# Patient Record
Sex: FEMALE | URBAN - NONMETROPOLITAN AREA
[De-identification: names, ages, dates, MRNs, and addresses within clinical notes are randomized per-mention and may not be internally consistent; named-entity substitution may affect disease eponyms.]

---

## 2018-02-19 ENCOUNTER — TELEPHONE (OUTPATIENT)
Dept: FAMILY MEDICINE CLINIC | Age: 63
End: 2018-02-19

## 2018-02-19 DIAGNOSIS — I69.398 OTHER SEQUELAE OF CEREBRAL INFARCTION: ICD-10-CM

## 2018-02-19 DIAGNOSIS — I25.10 ATHEROSCLEROSIS OF NATIVE CORONARY ARTERY OF NATIVE HEART WITHOUT ANGINA PECTORIS: ICD-10-CM

## 2018-02-19 DIAGNOSIS — M62.81 MUSCLE WEAKNESS (GENERALIZED): ICD-10-CM

## 2018-02-19 DIAGNOSIS — I69.311 MEMORY DEFICIT FOLLOWING CEREBRAL INFARCTION: Primary | ICD-10-CM

## 2018-02-19 DIAGNOSIS — I10 ESSENTIAL (PRIMARY) HYPERTENSION: ICD-10-CM

## 2018-02-19 DIAGNOSIS — F01.50 VASCULAR DEMENTIA WITHOUT BEHAVIORAL DISTURBANCE (HCC): ICD-10-CM

## 2018-02-19 PROCEDURE — G0180 MD CERTIFICATION HHA PATIENT: HCPCS | Performed by: FAMILY MEDICINE

## 2019-04-03 LAB
AGE FOR GFR: 63
ANION GAP SERPL CALCULATED.3IONS-SCNC: 11 MMOL/L
BASOPHILS # BLD: 0.05 THOU/MM3 (ref 0–0.3)
BUN BLDV-MCNC: 45 MG/DL (ref 7–17)
CALCIUM SERPL-MCNC: 9.5 MG/DL (ref 8.4–10.2)
CHLORIDE BLD-SCNC: 110 MMOL/L (ref 98–120)
CO2: 26 MMOL/L (ref 22–31)
CREAT SERPL-MCNC: 1.8 MG/DL (ref 0.5–1)
DIFFERENTIAL: AUTOMATED DIFF
EGFR BF: 34 ML/MIN/1.73 M2
EGFR BM: 46 ML/MIN/1.73 M2
EGFR WF: 28 ML/MIN/1.73 M2
EGFR WM: 38 ML/MIN/1.73 M2
EOSINOPHIL # BLD: 0.25 THOU/MM3 (ref 0–1.1)
GLUCOSE: 96 MG/DL (ref 65–105)
HCT VFR BLD CALC: 35.9 % (ref 37–47)
HEMOGLOBIN: 10.9 G/DL (ref 12–16)
LYMPHOCYTES # BLD: 2.55 THOU/MM3 (ref 1–5.5)
MCH RBC QN AUTO: 27.3 PG (ref 28.5–32)
MCHC RBC AUTO-ENTMCNC: 30.4 G/DL (ref 32–37)
MCV RBC AUTO: 89.9 FL (ref 80–94)
MONOCYTES # BLD: 0.54 THOU/MM3 (ref 0.1–1)
NEUTROPHILS: 4.46 THOU/MM3 (ref 2–8.1)
PDW BLD-RTO: 14.7 % (ref 8.5–15.5)
PLATELET # BLD: 282 THOU/MM3 (ref 130–400)
PMV BLD AUTO: 5.8 FL (ref 7.4–11)
POTASSIUM SERPL-SCNC: 4.6 MMOL/L (ref 3.6–5)
RBC # BLD: 4 M/UL (ref 4.2–5.4)
SODIUM BLD-SCNC: 142 MMOL/L (ref 135–145)
T4 FREE: 0.99 NG/DL (ref 0.78–2.1)
TSH SERPL DL<=0.05 MIU/L-ACNC: 1.4 MIU/ML (ref 0.49–4.6)
WBC # BLD: 7.84 THOU/ML3 (ref 4.8–10)

## 2019-05-30 LAB
A/G RATIO: 1.4 RATIO
AGE FOR GFR: 63
ALBUMIN: 3.6 G/DL (ref 3.5–5)
ALK PHOSPHATASE: 79 UNITS/L (ref 38–126)
ALT SERPL-CCNC: 25 UNITS/L (ref 9–52)
ANION GAP SERPL CALCULATED.3IONS-SCNC: 15 MMOL/L
APPEARANCE: ABNORMAL
AST SERPL-CCNC: 15 UNITS/L (ref 14–36)
BACTERIA: ABNORMAL 1HPF
BASOPHILS # BLD: 0.06 THOU/MM3 (ref 0–0.3)
BILIRUB SERPL-MCNC: 0.4 MG/DL (ref 0.2–1.3)
BILIRUBIN: NEGATIVE
BLOOD: NEGATIVE
BUN BLDV-MCNC: 47 MG/DL (ref 7–17)
CALCIUM SERPL-MCNC: 9.8 MG/DL (ref 8.4–10.2)
CASTS: PRESENT /LPF
CHLORIDE BLD-SCNC: 106 MMOL/L (ref 98–120)
CO2: 25 MMOL/L (ref 22–31)
COLOR: YELLOW
CREAT SERPL-MCNC: 2.2 MG/DL (ref 0.5–1)
CRYSTALS: ABNORMAL /HPF
DIFFERENTIAL: AUTOMATED DIFF
EGFR BF: 27 ML/MIN/1.73 M2
EGFR BM: 37 ML/MIN/1.73 M2
EGFR WF: 23 ML/MIN/1.73 M2
EGFR WM: 30 ML/MIN/1.73 M2
EOSINOPHIL # BLD: 0.16 THOU/MM3 (ref 0–1.1)
EPITHELIAL CELLS, UA: ABNORMAL /HPF
GLOBULIN: 2.5 G/DL
GLUCOSE: 85 MG/DL (ref 65–105)
GLUCOSE: NEGATIVE MG/DL
HCT VFR BLD CALC: 32.1 % (ref 37–47)
HEMOGLOBIN: 9.7 G/DL (ref 12–16)
HYALINE CASTS: ABNORMAL
KETONES: NEGATIVE MG/DL
LEUKOCYTES, UA: NEGATIVE
LYMPHOCYTES # BLD: 2.55 THOU/MM3 (ref 1–5.5)
MCH RBC QN AUTO: 27.7 PG (ref 28.5–32)
MCHC RBC AUTO-ENTMCNC: 30.3 G/DL (ref 32–37)
MCV RBC AUTO: 91.4 FL (ref 80–94)
MICROSCOPIC URINE: ABNORMAL
MONOCYTES # BLD: 0.67 THOU/MM3 (ref 0.1–1)
MUCUS: ABNORMAL /HPF
NEUTROPHILS: 4.5 THOU/MM3 (ref 2–8.1)
NITRITE, URINE: NEGATIVE
PDW BLD-RTO: 15.2 % (ref 8.5–15.5)
PH: 5.5 PH (ref 5–8.5)
PLATELET # BLD: 401 THOU/MM3 (ref 130–400)
PMV BLD AUTO: 6.1 FL (ref 7.4–11)
POTASSIUM SERPL-SCNC: 4.7 MMOL/L (ref 3.6–5)
PROTEIN,SCREEN: NEGATIVE MG/DL
RBC # BLD: 3.51 M/UL (ref 4.2–5.4)
RBC: ABNORMAL /HPF (ref 0–3)
SODIUM BLD-SCNC: 141 MMOL/L (ref 135–145)
SPECIFIC GRAVITY, URINE: 1.02 MG/DL (ref 1–1)
TOTAL PROTEIN: 6.1 G/DL (ref 6.3–8.2)
URINE CULTURE, ROUTINE: NORMAL
UROBILINOGEN, URINE: 0.2 MG/DL (ref 0.2–1)
WBC # BLD: 7.93 THOU/ML3 (ref 4.8–10)
WBC URINE: ABNORMAL (ref 0–4)
YEAST: ABNORMAL /HPF

## 2019-06-03 LAB
AGE FOR GFR: 63
ANION GAP SERPL CALCULATED.3IONS-SCNC: 11 MMOL/L
BUN BLDV-MCNC: 48 MG/DL (ref 7–17)
CALCIUM SERPL-MCNC: 9.9 MG/DL (ref 8.4–10.2)
CHLORIDE BLD-SCNC: 107 MMOL/L (ref 98–120)
CO2: 26 MMOL/L (ref 22–31)
CREAT SERPL-MCNC: 2.1 MG/DL (ref 0.5–1)
EGFR BF: 29 ML/MIN/1.73 M2
EGFR BM: 39 ML/MIN/1.73 M2
EGFR WF: 24 ML/MIN/1.73 M2
EGFR WM: 32 ML/MIN/1.73 M2
FERRITIN: 90 NG/ML (ref 10–28)
GLUCOSE: 95 MG/DL (ref 65–105)
IRON: 54 MG/DL (ref 37–170)
POTASSIUM SERPL-SCNC: 4.4 MMOL/L (ref 3.6–5)
SODIUM BLD-SCNC: 140 MMOL/L (ref 135–145)
VITAMIN B-12: 711 PG/ML (ref 239–931)

## 2019-06-10 LAB
AGE FOR GFR: 63
ANION GAP SERPL CALCULATED.3IONS-SCNC: 11 MMOL/L
BASOPHILS # BLD: 0.09 THOU/MM3 (ref 0–0.3)
BUN BLDV-MCNC: 43 MG/DL (ref 7–17)
CALCIUM SERPL-MCNC: 9.8 MG/DL (ref 8.4–10.2)
CHLORIDE BLD-SCNC: 110 MMOL/L (ref 98–120)
CO2: 25 MMOL/L (ref 22–31)
CREAT SERPL-MCNC: 1.9 MG/DL (ref 0.5–1)
DIFFERENTIAL: AUTOMATED DIFF
EGFR BF: 32 ML/MIN/1.73 M2
EGFR BM: 44 ML/MIN/1.73 M2
EGFR WF: 27 ML/MIN/1.73 M2
EGFR WM: 36 ML/MIN/1.73 M2
EOSINOPHIL # BLD: 0.32 THOU/MM3 (ref 0–1.1)
GLUCOSE: 83 MG/DL (ref 65–105)
HCT VFR BLD CALC: 31.2 % (ref 37–47)
HEMOGLOBIN: 9.8 G/DL (ref 12–16)
LYMPHOCYTES # BLD: 2.72 THOU/MM3 (ref 1–5.5)
MCH RBC QN AUTO: 28.6 PG (ref 28.5–32)
MCHC RBC AUTO-ENTMCNC: 31.3 G/DL (ref 32–37)
MCV RBC AUTO: 91.5 FL (ref 80–94)
MONOCYTES # BLD: 0.64 THOU/MM3 (ref 0.1–1)
NEUTROPHILS: 4.81 THOU/MM3 (ref 2–8.1)
PDW BLD-RTO: 14.9 % (ref 8.5–15.5)
PLATELET # BLD: 373 THOU/MM3 (ref 130–400)
PMV BLD AUTO: 6.1 FL (ref 7.4–11)
POTASSIUM SERPL-SCNC: 4.8 MMOL/L (ref 3.6–5)
RBC # BLD: 3.41 M/UL (ref 4.2–5.4)
SODIUM BLD-SCNC: 141 MMOL/L (ref 135–145)
WBC # BLD: 8.59 THOU/ML3 (ref 4.8–10)

## 2019-08-22 LAB
AGE FOR GFR: 63
ANION GAP SERPL CALCULATED.3IONS-SCNC: 14 MMOL/L
BASOPHILS # BLD: 0.07 THOU/MM3 (ref 0–0.3)
BUN BLDV-MCNC: 51 MG/DL (ref 7–17)
CALCIUM SERPL-MCNC: 9.8 MG/DL (ref 8.4–10.2)
CHLORIDE BLD-SCNC: 110 MMOL/L (ref 98–120)
CO2: 24 MMOL/L (ref 22–31)
CREAT SERPL-MCNC: 2.1 MG/DL (ref 0.5–1)
DIFFERENTIAL: AUTOMATED DIFF
EGFR BF: 29 ML/MIN/1.73 M2
EGFR BM: 39 ML/MIN/1.73 M2
EGFR WF: 24 ML/MIN/1.73 M2
EGFR WM: 32 ML/MIN/1.73 M2
EOSINOPHIL # BLD: 0.3 THOU/MM3 (ref 0–1.1)
GLUCOSE: 94 MG/DL (ref 65–105)
HCT VFR BLD CALC: 35.1 % (ref 37–47)
HEMOGLOBIN: 10.5 G/DL (ref 12–16)
LYMPHOCYTES # BLD: 2.55 THOU/MM3 (ref 1–5.5)
MCH RBC QN AUTO: 27.8 PG (ref 28.5–32)
MCHC RBC AUTO-ENTMCNC: 29.8 G/DL (ref 32–37)
MCV RBC AUTO: 93.5 FL (ref 80–94)
MONOCYTES # BLD: 0.56 THOU/MM3 (ref 0.1–1)
NEUTROPHILS: 4.44 THOU/MM3 (ref 2–8.1)
PDW BLD-RTO: 14 % (ref 8.5–15.5)
PLATELET # BLD: 316 THOU/MM3 (ref 130–400)
PMV BLD AUTO: 6.3 FL (ref 7.4–11)
POTASSIUM SERPL-SCNC: 4.8 MMOL/L (ref 3.6–5)
RBC # BLD: 3.76 M/UL (ref 4.2–5.4)
SODIUM BLD-SCNC: 143 MMOL/L (ref 135–145)
WBC # BLD: 7.92 THOU/ML3 (ref 4.8–10)

## 2019-09-05 ENCOUNTER — OFFICE VISIT (OUTPATIENT)
Dept: SURGERY | Age: 64
End: 2019-09-05
Payer: MEDICAID

## 2019-09-05 VITALS
DIASTOLIC BLOOD PRESSURE: 59 MMHG | HEIGHT: 67 IN | BODY MASS INDEX: 36.88 KG/M2 | TEMPERATURE: 97.9 F | SYSTOLIC BLOOD PRESSURE: 106 MMHG | HEART RATE: 83 BPM | WEIGHT: 235 LBS

## 2019-09-05 DIAGNOSIS — D64.9 CHRONIC ANEMIA: Primary | ICD-10-CM

## 2019-09-05 DIAGNOSIS — R53.82 CHRONIC FATIGUE: ICD-10-CM

## 2019-09-05 DIAGNOSIS — I95.9 HYPOTENSION, UNSPECIFIED HYPOTENSION TYPE: ICD-10-CM

## 2019-09-05 PROCEDURE — 99203 OFFICE O/P NEW LOW 30 MIN: CPT | Performed by: SURGERY

## 2019-09-05 RX ORDER — HYDROCHLOROTHIAZIDE 25 MG/1
25 TABLET ORAL DAILY
COMMUNITY

## 2019-09-05 RX ORDER — HYDROCODONE BITARTRATE AND ACETAMINOPHEN 5; 325 MG/1; MG/1
1 TABLET ORAL EVERY 8 HOURS PRN
COMMUNITY
End: 2020-04-27 | Stop reason: SDUPTHER

## 2019-09-05 RX ORDER — ACETAMINOPHEN 500 MG
1000 TABLET ORAL 3 TIMES DAILY PRN
COMMUNITY

## 2019-09-05 RX ORDER — FUROSEMIDE 40 MG/1
40 TABLET ORAL DAILY
COMMUNITY

## 2019-09-05 RX ORDER — AMLODIPINE BESYLATE 2.5 MG/1
7.5 TABLET ORAL DAILY
COMMUNITY

## 2019-09-05 RX ORDER — METOPROLOL TARTRATE 50 MG/1
50 TABLET, FILM COATED ORAL 2 TIMES DAILY
COMMUNITY

## 2019-09-05 RX ORDER — ATORVASTATIN CALCIUM 20 MG/1
20 TABLET, FILM COATED ORAL DAILY
COMMUNITY

## 2019-09-05 RX ORDER — FERROUS SULFATE 325(65) MG
325 TABLET ORAL DAILY
COMMUNITY

## 2019-09-05 RX ORDER — LISINOPRIL 10 MG/1
10 TABLET ORAL DAILY
COMMUNITY

## 2019-09-09 LAB
CHOLESTEROL/HDL RATIO: 4.6 RATIO (ref 0–4.5)
CHOLESTEROL: 176 MG/DL (ref 50–200)
HDL, DIRECT: 38 MG/DL (ref 36–68)
LDL CHOLESTEROL CALCULATED: 89.8 MG/DL (ref 0–160)
TRIGL SERPL-MCNC: 241 MG/DL (ref 10–250)
VLDLC SERPL CALC-MCNC: 48 MG/DL (ref 0–40)

## 2019-09-20 LAB — PATHOLOGY REPORT: NORMAL

## 2019-09-21 LAB — CLOTEST: NEGATIVE

## 2019-10-03 DIAGNOSIS — A04.8 H. PYLORI INFECTION: Primary | ICD-10-CM

## 2019-10-03 RX ORDER — CLARITHROMYCIN 500 MG/1
500 TABLET, COATED ORAL 2 TIMES DAILY
Qty: 28 TABLET | Refills: 0 | Status: SHIPPED | OUTPATIENT
Start: 2019-10-03 | End: 2019-10-17

## 2019-10-03 RX ORDER — OMEPRAZOLE 20 MG/1
40 CAPSULE, DELAYED RELEASE ORAL 2 TIMES DAILY
Qty: 56 CAPSULE | Refills: 0 | Status: SHIPPED | OUTPATIENT
Start: 2019-10-03 | End: 2022-08-03

## 2019-10-03 RX ORDER — AMOXICILLIN 500 MG/1
500 CAPSULE ORAL 2 TIMES DAILY
Qty: 28 CAPSULE | Refills: 0 | Status: SHIPPED | OUTPATIENT
Start: 2019-10-03 | End: 2019-10-17

## 2019-10-21 ENCOUNTER — ANTI-COAG VISIT (OUTPATIENT)
Dept: FAMILY MEDICINE CLINIC | Age: 64
End: 2019-10-21

## 2019-10-21 LAB
AGE FOR GFR: 63
ANION GAP SERPL CALCULATED.3IONS-SCNC: 17 MMOL/L
ANISOCYTOSIS: ABNORMAL
BASOPHILS # BLD: 0.07 THOU/MM3 (ref 0–0.3)
BUN BLDV-MCNC: 67 MG/DL (ref 7–17)
CALCIUM SERPL-MCNC: 10.4 MG/DL (ref 8.4–10.2)
CHLORIDE BLD-SCNC: 112 MMOL/L (ref 98–120)
CO2: 21 MMOL/L (ref 22–31)
CREAT SERPL-MCNC: 2.7 MG/DL (ref 0.5–1)
DIFFERENTIAL: AUTOMATED DIFF
EGFR BF: 22 ML/MIN/1.73 M2
EGFR BM: 29 ML/MIN/1.73 M2
EGFR WF: 18 ML/MIN/1.73 M2
EGFR WM: 24 ML/MIN/1.73 M2
EOSINOPHIL # BLD: 0.35 THOU/MM3 (ref 0–1.1)
GLUCOSE: 88 MG/DL (ref 65–105)
HCT VFR BLD CALC: 36.6 % (ref 37–47)
HEMOGLOBIN: 10.4 G/DL (ref 12–16)
HYPOCHROMIA: ABNORMAL
LYMPHOCYTES # BLD: 1.95 THOU/MM3 (ref 1–5.5)
MCH RBC QN AUTO: 26.5 PG (ref 28.5–32)
MCHC RBC AUTO-ENTMCNC: 28.6 G/DL (ref 32–37)
MCV RBC AUTO: 92.7 FL (ref 80–94)
MONOCYTES # BLD: 0.6 THOU/MM3 (ref 0.1–1)
MORPHOLOGY: ABNORMAL
NEUTROPHILS: 5.71 THOU/MM3 (ref 2–8.1)
OVALOCYTES: ABNORMAL
PDW BLD-RTO: 15.6 % (ref 8.5–15.5)
PLATELET # BLD: 352 THOU/MM3 (ref 130–400)
PMV BLD AUTO: 6.5 FL (ref 7.4–11)
POTASSIUM SERPL-SCNC: 6.2 MMOL/L (ref 3.6–5)
RBC # BLD: 3.95 M/UL (ref 4.2–5.4)
SODIUM BLD-SCNC: 144 MMOL/L (ref 135–145)
WBC # BLD: 8.68 THOU/ML3 (ref 4.8–10)

## 2019-10-22 LAB
AGE FOR GFR: 63
ANION GAP SERPL CALCULATED.3IONS-SCNC: 13 MMOL/L
BUN BLDV-MCNC: 63 MG/DL (ref 7–17)
CALCIUM SERPL-MCNC: 9.8 MG/DL (ref 8.4–10.2)
CHLORIDE BLD-SCNC: 114 MMOL/L (ref 98–120)
CO2: 22 MMOL/L (ref 22–31)
CREAT SERPL-MCNC: 2.6 MG/DL (ref 0.5–1)
EGFR BF: 22 ML/MIN/1.73 M2
EGFR BM: 30 ML/MIN/1.73 M2
EGFR WF: 19 ML/MIN/1.73 M2
EGFR WM: 25 ML/MIN/1.73 M2
GLUCOSE: 82 MG/DL (ref 65–105)
POTASSIUM SERPL-SCNC: 5.8 MMOL/L (ref 3.6–5)
SODIUM BLD-SCNC: 143 MMOL/L (ref 135–145)

## 2020-01-09 PROBLEM — I25.10 CORONARY ARTERY DISEASE INVOLVING NATIVE CORONARY ARTERY: Status: ACTIVE | Noted: 2020-01-09

## 2020-01-09 PROBLEM — I10 ESSENTIAL HYPERTENSION: Chronic | Status: ACTIVE | Noted: 2020-01-09

## 2020-01-09 PROBLEM — N18.4 CKD (CHRONIC KIDNEY DISEASE) STAGE 4, GFR 15-29 ML/MIN (HCC): Status: ACTIVE | Noted: 2020-01-09

## 2020-01-09 PROBLEM — I10 ESSENTIAL HYPERTENSION: Status: ACTIVE | Noted: 2020-01-09

## 2020-01-09 PROBLEM — I25.10 CORONARY ARTERY DISEASE INVOLVING NATIVE CORONARY ARTERY: Chronic | Status: ACTIVE | Noted: 2020-01-09

## 2020-01-09 PROBLEM — N18.4 CKD (CHRONIC KIDNEY DISEASE) STAGE 4, GFR 15-29 ML/MIN (HCC): Chronic | Status: ACTIVE | Noted: 2020-01-09

## 2020-01-09 PROBLEM — J44.9 COPD (CHRONIC OBSTRUCTIVE PULMONARY DISEASE) (HCC): Chronic | Status: ACTIVE | Noted: 2020-01-09

## 2020-01-09 PROBLEM — J44.9 COPD (CHRONIC OBSTRUCTIVE PULMONARY DISEASE) (HCC): Status: ACTIVE | Noted: 2020-01-09

## 2020-03-16 LAB
ANION GAP SERPL CALCULATED.3IONS-SCNC: 13 MMOL/L
BASOPHILS %: 0.58 (ref 0–3)
BASOPHILS ABSOLUTE: 0.05 (ref 0–0.3)
BUN BLDV-MCNC: 81 MG/DL (ref 7–17)
CALCIUM SERPL-MCNC: 10.6 MG/DL (ref 8.4–10.2)
CHLORIDE BLD-SCNC: 114 MMOL/L (ref 98–120)
CO2: 25 MMOL/L (ref 22–31)
CREAT SERPL-MCNC: 2.9 MG/DL (ref 0.5–1)
EOSINOPHILS %: 4.1 (ref 0–10)
EOSINOPHILS ABSOLUTE: 0.36 (ref 0–1.1)
FERRITIN: 137 NG/DL (ref 10–282)
GFR CALCULATED: 21
GLUCOSE: 80 MG/DL (ref 65–105)
HCT VFR BLD CALC: 35.4 % (ref 37–47)
HEMOGLOBIN: 10.1 (ref 12–16)
HYPOCHROMIA: NORMAL
IRON SATURATION: 20 % (ref 15–38)
IRON: 52 MG/DL (ref 37–170)
LYMPHOCYTE %: 29.03 (ref 20–51.1)
LYMPHOCYTES ABSOLUTE: 2.54 (ref 1–5.5)
MACROCYTOSIS: NORMAL
MCH RBC QN AUTO: 27.7 PG (ref 28.5–32.5)
MCHC RBC AUTO-ENTMCNC: 28.6 G/DL (ref 32–37)
MCV RBC AUTO: 96.7 FL (ref 80–94)
MONOCYTES %: 5.85 (ref 1.7–9.3)
MONOCYTES ABSOLUTE: 0.51 (ref 0.1–1)
NEUTROPHILS %: 60.44 (ref 42.2–75.2)
NEUTROPHILS ABSOLUTE: 5.29 (ref 2–8.1)
OVALOCYTES: NORMAL
PDW BLD-RTO: 13.5 % (ref 8.5–15.5)
PLATELET # BLD: 366.4 THOU/MM3 (ref 130–400)
PLATELETS, LARGE: NORMAL
POIKILOCYTES: NORMAL
POTASSIUM SERPL-SCNC: 5 MMOL/L (ref 3.6–5)
RBC: 3.66 M/UL (ref 4.2–5.4)
SODIUM BLD-SCNC: 147 MMOL/L (ref 135–145)
TOTAL IRON BINDING CAPACITY: 258 MG/DL (ref 261–497)
VITAMIN D 25-HYDROXY: 31.3 NG/ML (ref 30–100)
WBC: 8.8 THOU/ML3 (ref 4.8–10.8)

## 2020-04-01 ENCOUNTER — OUTSIDE SERVICES (OUTPATIENT)
Dept: FAMILY MEDICINE CLINIC | Age: 65
End: 2020-04-01
Payer: MEDICAID

## 2020-04-01 PROCEDURE — 99308 SBSQ NF CARE LOW MDM 20: CPT | Performed by: FAMILY MEDICINE

## 2020-04-27 RX ORDER — HYDROCODONE BITARTRATE AND ACETAMINOPHEN 5; 325 MG/1; MG/1
1 TABLET ORAL 2 TIMES DAILY
Qty: 120 TABLET | Refills: 0 | Status: SHIPPED | OUTPATIENT
Start: 2020-04-27 | End: 2020-05-20 | Stop reason: SDUPTHER

## 2020-05-04 LAB
ANION GAP SERPL CALCULATED.3IONS-SCNC: 5.5 MMOL/L
BUN BLDV-MCNC: 58 MG/DL (ref 7–17)
CALCIUM SERPL-MCNC: 9.7 MG/DL (ref 8.4–10.2)
CHLORIDE BLD-SCNC: 109 MMOL/L (ref 98–120)
CO2: 27 MMOL/L (ref 22–31)
CREAT SERPL-MCNC: 2.4 MG/DL (ref 0.5–1)
GFR CALCULATED: 26.1
GLUCOSE: 75 MG/DL (ref 65–105)
POTASSIUM SERPL-SCNC: 4.7 MMOL/L (ref 3.6–5)
SODIUM BLD-SCNC: 142 MMOL/L (ref 135–145)

## 2020-05-20 RX ORDER — HYDROCODONE BITARTRATE AND ACETAMINOPHEN 5; 325 MG/1; MG/1
1 TABLET ORAL 2 TIMES DAILY
Qty: 120 TABLET | Refills: 0 | Status: SHIPPED | OUTPATIENT
Start: 2020-05-20 | End: 2020-08-24 | Stop reason: SDUPTHER

## 2020-05-27 ENCOUNTER — OUTSIDE SERVICES (OUTPATIENT)
Dept: FAMILY MEDICINE CLINIC | Age: 65
End: 2020-05-27
Payer: MEDICAID

## 2020-05-27 PROCEDURE — 99308 SBSQ NF CARE LOW MDM 20: CPT | Performed by: FAMILY MEDICINE

## 2020-07-22 ENCOUNTER — OUTSIDE SERVICES (OUTPATIENT)
Dept: FAMILY MEDICINE CLINIC | Age: 65
End: 2020-07-22
Payer: MEDICAID

## 2020-07-22 PROCEDURE — 99308 SBSQ NF CARE LOW MDM 20: CPT | Performed by: FAMILY MEDICINE

## 2020-07-24 NOTE — PROGRESS NOTES
[address] [facility]     Date of Service:  7/22/2020  Saintclair Mates  Date of Birth 1955  MRN: [id number]      Chief Complaint:  Follow up of chronic medical conditions. History of Present Illness:   Overall she seems to be fairly stable. Her pain seems to be mostly controlled with the current care. Kidney function is stable. She does not seem to have significant mood issues. Current Review of Systems:   HEENT: Her grinding of her teeth seems less. Respiratory: Patient denies issues. Cardiovascular: Patient denies issues. GI: Patient denies issues. : Patient denies issues. Musculoskeletal: She does not seem to have any significant pain. Neurological: No new problems. Vascular: No new problems. Hematologic: No new problems. Endo: No new problems. Dermatologic: No new problems. Past Medical/Surgical/Social History:  Per my admission note. Physical Exam:  She is awake, alert, breathing easily, in no distress. Oral mucosa moistened. Sclerae anicteric. No nodes. Lungs: Clear. CV: Controlled rate. No irregularity or murmur noted. Abdomen: Soft without tenderness. There is no edema. Arthritic changes especially in the knees. No tremors or cogwheeling. Vital Signs:   Blood pressure 156/64. Pulse 82. Weight 212 pounds. Temperature 97.3. SPO2 96% on room air. Assessment:  1. Arthritis. 2. Hypertension. 3. Hyperlipidemia. 4. Coronary artery disease. 5. Right foot drop. 6. Obesity. 7. Cognitive insufficiency. 8. History of cerebrovascular accident. 9. Renal insufficiency. 10. Anemia, history of. 11. Colon polyps. 12. Low back pain. 13. Vitamin D deficiency. Plan:  Overall she seems to be doing fairly well. Her blood pressure is up a little bit today. Looking back at other blood pressure they are still ton tending to run high . I'm going to increase her Norvasc to 7.4mg. I will maintain the other medicines as listed.  I need to be notified of problems or concerns. Nguyễn Rosenberg am personally transcribing for Deborah French MD 7/24/20 at 9:03 AM EDT. Flavio Jo MD, personally performed the services described in this document as transcribed by the , and it is both accurate and complete.     Electronically signed by Deborah French MD on 7/24/20 at 4:07 PM EDT

## 2020-08-24 RX ORDER — HYDROCODONE BITARTRATE AND ACETAMINOPHEN 5; 325 MG/1; MG/1
1 TABLET ORAL 2 TIMES DAILY
Qty: 120 TABLET | Refills: 0 | Status: SHIPPED | OUTPATIENT
Start: 2020-08-24 | End: 2020-09-21 | Stop reason: SDUPTHER

## 2020-09-18 ENCOUNTER — OUTSIDE SERVICES (OUTPATIENT)
Dept: FAMILY MEDICINE CLINIC | Age: 65
End: 2020-09-18
Payer: MEDICAID

## 2020-09-18 PROCEDURE — 99308 SBSQ NF CARE LOW MDM 20: CPT | Performed by: FAMILY MEDICINE

## 2020-09-18 NOTE — PROGRESS NOTES
4 [facility]    Date of Service:  9/18/2020  Mark Joseph  Date of Birth 1955  MRN: [id number]    TELEHEALTH EVALUATION -- Audio/Visual (During YWQTY-16 public health emergency)    Chief Complaint:  Follow up of chronic medical conditions. History of Present Illness:   Overall she seems to be in fairly good spirits. The current dose of Norco seems to control her pain. She does have her kidney dysfunction. She does have some labs that have been ordered by nephrology. Staff has not noticed other issues. Current Review of Systems:   She denies HEENT, respiratory, cardiovascular, GI, , musculoskeletal, neurological, vascular, hematologic, endo, dermatologic changes. Past Medical/Surgical/Social History:  Per my admission note. Physical Exam:  She is awake, alert, breathing easily, in no distress. Oral mucosa moistened. There are no neurological changes that I can detect. There is a trace to 1+ edema of her lower extremities. She otherwise is moving her extremities as she has been. Vital Signs:   Blood pressure 134/85. Pulse 80. Weight 209 pounds. Temperature 96.5. SPO2 96% on room air. Assessment:  1. Arthritis. 2. Hypertension. 3. Hyperlipidemia. 4. Coronary artery disease. 5. Right foot drop. 6. Obesity. 7. Cognitive insufficiency. 8. History of cerebrovascular accident. 9. Renal insufficiency. 10. Anemia, history of. 11. Colon polyps. 12. Low back pain, history of. 13. Vitamin D deficiency.       Plan: We are going to get those labs reordered. Clinically she seems comfortable. She is on Norco. At best time I will maintain our current care. I need to be notified of problems or concerns. Mark Joseph is a 59 y.o. female being evaluated by a Virtual Visit (video visit) encounter to address concerns as mentioned above. A caregiver was present when appropriate.  Due to this being a TeleHealth encounter (During ONHBS-81 public health emergency), evaluation of the following organ systems was limited: Vitals/Constitutional/EENT/Resp/CV/GI//MS/Neuro/Skin/Heme-Lymph-Imm. Pursuant to the emergency declaration under the [facility], [address] [place] [address] authority and the [employer] Act, this Virtual Visit was conducted with patient's (and/or legal guardian's) consent, to reduce the patient's risk of exposure to COVID-19 and provide necessary medical care. The patient (and/or legal guardian) has also been advised to contact this office for worsening conditions or problems, and seek emergency medical treatment and/or call 911 if deemed necessary. Services were provided through a video synchronous discussion virtually to substitute for in-person clinic visit. Patient seen via video visit at [facility] and provider was in the office. Nguyễn Rosenberg am personally transcribing for Deborah French MD 9/18/20 at 10:53 AM EDT. Flavio Jo MD, personally performed the services described in this document as transcribed by the , and it is both accurate and complete.     Electronically signed by Deborah French MD on 9/21/20 at 1:01 PM EDT

## 2020-09-21 LAB
ANION GAP SERPL CALCULATED.3IONS-SCNC: 8.7 MMOL/L
BUN BLDV-MCNC: 49 MG/DL (ref 7–17)
CALCIUM SERPL-MCNC: 9.8 MG/DL (ref 8.4–10.2)
CHLORIDE BLD-SCNC: 112 MMOL/L (ref 98–120)
CO2: 24 MMOL/L (ref 22–31)
CREAT SERPL-MCNC: 2.1 MG/DL (ref 0.5–1)
GFR CALCULATED: 30.5
GLUCOSE: 80 MG/DL (ref 65–105)
PHOSPHORUS: 3.5 MG/DL (ref 2.5–4.5)
POTASSIUM SERPL-SCNC: 4.8 MMOL/L (ref 3.6–5)
PTH INTACT: 52.63 PG/ML (ref 15–65)
SODIUM BLD-SCNC: 145 MMOL/L (ref 135–145)
VITAMIN D 25-HYDROXY: 51 NG/ML (ref 30–100)

## 2020-09-21 RX ORDER — HYDROCODONE BITARTRATE AND ACETAMINOPHEN 5; 325 MG/1; MG/1
1 TABLET ORAL 2 TIMES DAILY
Qty: 120 TABLET | Refills: 0 | Status: SHIPPED | OUTPATIENT
Start: 2020-09-21 | End: 2020-11-24 | Stop reason: SDUPTHER

## 2020-09-22 LAB
CREATININE, RANDOM URINE: 179.1 MG/DL (ref 20–370)
PROTEIN, URINE: 17 MG/DL (ref 0–12)

## 2020-10-07 ENCOUNTER — OUTSIDE SERVICES (OUTPATIENT)
Dept: FAMILY MEDICINE CLINIC | Age: 65
End: 2020-10-07
Payer: MEDICAID

## 2020-10-07 PROCEDURE — 99308 SBSQ NF CARE LOW MDM 20: CPT | Performed by: FAMILY MEDICINE

## 2020-10-14 NOTE — PROGRESS NOTES
4 [facility]    Date of Service:  10/7/2020  Aysha Espinal  Date of Birth 1955  MRN: [id number]      Chief Complaint:  Follow up of chronic medical conditions. History of Present Illness:   Last week I had given her some prednisone for some foot pain, she states that it did not help at all. It seems to her when she walks. She had a similar problem on her right foot several months ago but overtime it seemed to get better. Current Review of Systems:   HEENT:  Besides grinding her teeth she has no other complaints. Respiratory: Patient has no complains. Cardiovascular: Patient has no complains. GI: Patient has no complains. : Patient has no complains. Musculoskeletal: Aches and pains, especially the left foot. Neurological: No changes she is aware of. Vascular: No changes she is aware of. Hematologic: No changes she is aware of. Endo: No changes she is aware of. Dermatologic: No changes she is aware of. Past Medical/Surgical/Social History:  Per my admission note. Physical Exam:  She is awake, alert, breathing easily, in no distress. Oral mucosa moistened. Sclerae anicteric. No nodes. Lungs: Clear. CV: Controlled rate. No irregularity or murmur. Abdomen: Soft without tenderness. There is no edema. Her tenderness is on the left forefoot where are the metatarsals and tarsals meet up. There is no bruising or redness, but it does look just a little puffy in that region, no overt abnormal Bony abnormalities on palpation. Vital Signs:   Blood pressure 128/78. Pulse 77. Weight 209 pounds. Assessment:  1. Left foot pain. 2. Arthritis. 3. Hypertension. 4. Hyperlipidemia. 5. Coronary artery disease. 6. Right foot drop. 7. Obesity. 8. Cognitive insufficiency. 9. History of cerebrovascular accident. 10. Renal insufficiency. 11. Anemia, history of.  12. Colon polyps. 13. Low back pain, history of. 14. Vitamin D deficiency. Plan:    At this time the foot I am not sure what is causing the pain. I am going to do an x-ray. I suspect that she just flared up some arthritic changes. She does take some Norco PRN. Her renal insufficiency is fairly stable. She otherwise seems to be functioning well. If the x-ray is unremarkable, we may have therapy take a look and see if they can do anything for the foot. Otherwise, I will maintain the medicines as listed. Tonia Holland, am personally transcribing for Coleman Benton MD 10/14/20 at 12:48 PM EDT. Dayana Alex MD, personally performed the services described in this document as transcribed by the , and it is both accurate and complete.     Electronically signed by Coleman Benton MD on 10/15/20 at 7:28 AM EDT

## 2020-11-11 ENCOUNTER — OUTSIDE SERVICES (OUTPATIENT)
Dept: FAMILY MEDICINE CLINIC | Age: 65
End: 2020-11-11
Payer: MEDICAID

## 2020-11-11 PROCEDURE — 99308 SBSQ NF CARE LOW MDM 20: CPT | Performed by: FAMILY MEDICINE

## 2020-11-17 NOTE — PROGRESS NOTES
4 [facility]    Date of Service:  11/11/2020  Trevon Llamas  Date of Birth 1955  MRN: [id number]    TELEHEALTH EVALUATION -- Audio/Visual (During [place] [address] public health emergency)    Chief Complaint:  Follow up of chronic medical conditions. History of Present Illness:   She continues to have that left foot pain. Steroids, pain medicine, and therapy all did not seem to help at all. It still hurts with walking. Current Review of Systems:   She denies HEENT, respiratory, cardiovascular, GI, , musculoskeletal, neurologic, vascular, hematologic, endo, or dermatologic changes. Past Medical/Surgical/Social History:  Per my admission note. Physical Exam:  She is awake, alert, breathing easily. Sclerae anicteric. Oral mucosa moistened. No nodes. No edema. She is moving her extremities fairly well. There is still tenderness when the nurse palpated on the top of her left foot. There is no other associated findings. Vital Signs:   Blood pressure 120/68. Pulse 60. Weight 213.6 pounds. Temperature 97.5. SPO2 96% on room air. Assessment:  1. Left foot pain. 2. Arthritis. 3. Hypertension. 4. Hyperlipidemia. 5. Coronary artery disease. 6. Right foot drop. 7. Obesity. 8. Cognitive insufficiency. 9. History of cerebrovascular accident. 10. Renal insufficiency. 11. Anemia, history of.  12. Colon polyps. 13. Low back pain, history of. 14. Vitamin D deficiency. Plan:  I am going to try some topical Voltaren gel to see if that gives her any benefit. If the family would like we can always increase her Norco but in the past they have resisted that. She needs to try to keep active. Local therapy reviewed. I will maintain the other medicines as listed. She will follow up with her consultants as needed. I need to be notified of problems or concerns.        Trevon Llamas is a 59 y.o. female being evaluated by a Virtual Visit (video visit) encounter to address concerns as mentioned above. A caregiver was present when appropriate. Due to this being a TeleHealth encounter (During MWGSK-48 public health emergency), evaluation of the following organ systems was limited: Vitals/Constitutional/EENT/Resp/CV/GI//MS/Neuro/Skin/Heme-Lymph-Imm. Pursuant to the emergency declaration under the [address] [place] [address], [address] [place] [address] authority and the [employer] Act, this Virtual Visit was conducted with patient's (and/or legal guardian's) consent, to reduce the patient's risk of exposure to COVID-19 and provide necessary medical care. The patient (and/or legal guardian) has also been advised to contact this office for worsening conditions or problems, and seek emergency medical treatment and/or call 911 if deemed necessary. Services were provided through a video synchronous discussion virtually to substitute for in-person clinic visit. Patient seen via video visit at [facility] and provider was in the office. Anthony CRUZ am personally transcribing for Osvaldo Marks MD 11/17/20 at 4:48 PM EST. Karine Carty MD, personally performed the services described in this document as transcribed by the , and it is both accurate and complete.     Electronically signed by Osvaldo Marks MD on 11/18/20 at 10:35 AM EST

## 2020-11-24 ENCOUNTER — TELEPHONE (OUTPATIENT)
Dept: FAMILY MEDICINE CLINIC | Age: 65
End: 2020-11-24

## 2020-11-24 RX ORDER — HYDROCODONE BITARTRATE AND ACETAMINOPHEN 5; 325 MG/1; MG/1
1 TABLET ORAL 2 TIMES DAILY
Qty: 120 TABLET | Refills: 0 | Status: SHIPPED | OUTPATIENT
Start: 2020-11-24 | End: 2020-12-18 | Stop reason: SDUPTHER

## 2020-11-24 NOTE — TELEPHONE ENCOUNTER
Notified [facility], [place], [facility] to contact Dr Bib Ortiz directly

## 2020-12-18 RX ORDER — HYDROCODONE BITARTRATE AND ACETAMINOPHEN 5; 325 MG/1; MG/1
1 TABLET ORAL 2 TIMES DAILY
Qty: 120 TABLET | Refills: 0 | Status: SHIPPED | OUTPATIENT
Start: 2020-12-18 | End: 2021-02-19 | Stop reason: SDUPTHER

## 2021-01-13 ENCOUNTER — OUTSIDE SERVICES (OUTPATIENT)
Dept: FAMILY MEDICINE CLINIC | Age: 66
End: 2021-01-13
Payer: MEDICAID

## 2021-01-13 DIAGNOSIS — N28.9 RENAL INSUFFICIENCY: ICD-10-CM

## 2021-01-13 DIAGNOSIS — M19.90 ARTHRITIS: Primary | ICD-10-CM

## 2021-01-13 DIAGNOSIS — I25.119 CORONARY ARTERY DISEASE INVOLVING NATIVE HEART WITH ANGINA PECTORIS, UNSPECIFIED VESSEL OR LESION TYPE (HCC): ICD-10-CM

## 2021-01-13 DIAGNOSIS — I10 HYPERTENSION, UNSPECIFIED TYPE: ICD-10-CM

## 2021-01-13 PROCEDURE — 99308 SBSQ NF CARE LOW MDM 20: CPT | Performed by: FAMILY MEDICINE

## 2021-01-18 NOTE — PROGRESS NOTES
Elma Kiran MD, personally performed the services described in this document as transcribed by the , and it is both accurate and complete.     Electronically signed by Ike Zarate MD on 1/19/21 at 8:17 AM EST

## 2021-02-19 DIAGNOSIS — M19.90 ARTHRITIS: ICD-10-CM

## 2021-02-19 RX ORDER — HYDROCODONE BITARTRATE AND ACETAMINOPHEN 5; 325 MG/1; MG/1
1 TABLET ORAL 2 TIMES DAILY
Qty: 120 TABLET | Refills: 0 | Status: SHIPPED | OUTPATIENT
Start: 2021-02-19 | End: 2021-04-20

## 2021-03-11 LAB — URIC ACID: 10.8 MG/DL (ref 3.5–8.5)

## 2024-05-28 ENCOUNTER — HOSPITAL ENCOUNTER (OUTPATIENT)
Age: 69
Setting detail: SPECIMEN
Discharge: HOME | End: 2024-05-28
Payer: MEDICAID

## 2024-05-28 LAB
ALBUMIN SERPL-MCNC: 3.5 G/DL (ref 3.5–5.2)
ALP SERPL-CCNC: 103 U/L (ref 35–104)
ALT SERPL-CCNC: 12 U/L (ref 5–33)
ANION GAP SERPL CALCULATED.3IONS-SCNC: 11 MMOL/L (ref 9–17)
AST SERPL-CCNC: 13 U/L
BILIRUB SERPL-MCNC: 0.2 MG/DL (ref 0.3–1.2)
BNP SERPL-MCNC: 427 PG/ML
BUN SERPL-MCNC: 47 MG/DL (ref 8–23)
BUN/CREAT SERPL: 21 (ref 9–20)
CALCIUM SERPL-MCNC: 9.8 MG/DL (ref 8.6–10.4)
CHLORIDE SERPL-SCNC: 114 MMOL/L (ref 98–107)
CHOLEST SERPL-MCNC: 207 MG/DL (ref 0–199)
CHOLESTEROL/HDL RATIO: 6
CO2 SERPL-SCNC: 26 MMOL/L (ref 20–31)
CREAT SERPL-MCNC: 2.2 MG/DL (ref 0.5–0.9)
ERYTHROCYTE [DISTWIDTH] IN BLOOD BY AUTOMATED COUNT: 15.5 % (ref 11.8–14.4)
EST. AVERAGE GLUCOSE BLD GHB EST-MCNC: 146 MG/DL
GFR, ESTIMATED: 24 ML/MIN/1.73M2
GLUCOSE SERPL-MCNC: 119 MG/DL (ref 70–99)
HBA1C MFR BLD: 6.7 % (ref 4–6)
HCT VFR BLD AUTO: 37.3 % (ref 36.3–47.1)
HDLC SERPL-MCNC: 36 MG/DL
HGB BLD-MCNC: 11 G/DL (ref 11.9–15.1)
LDLC SERPL CALC-MCNC: 118 MG/DL (ref 0–100)
MCH RBC QN AUTO: 28.4 PG (ref 25.2–33.5)
MCHC RBC AUTO-ENTMCNC: 29.5 G/DL (ref 28.4–34.8)
MCV RBC AUTO: 96.1 FL (ref 82.6–102.9)
NRBC BLD-RTO: 0 PER 100 WBC
PLATELET # BLD AUTO: 316 K/UL (ref 138–453)
PMV BLD AUTO: 10.2 FL (ref 8.1–13.5)
POTASSIUM SERPL-SCNC: 4.3 MMOL/L (ref 3.7–5.3)
PROT SERPL-MCNC: 6.4 G/DL (ref 6.4–8.3)
RBC # BLD AUTO: 3.88 M/UL (ref 3.95–5.11)
SODIUM SERPL-SCNC: 151 MMOL/L (ref 135–144)
TRIGL SERPL-MCNC: 267 MG/DL
VLDLC SERPL CALC-MCNC: 53 MG/DL
WBC OTHER # BLD: 8.2 K/UL (ref 3.5–11.3)

## 2024-05-28 PROCEDURE — 80061 LIPID PANEL: CPT

## 2024-05-28 PROCEDURE — 80053 COMPREHEN METABOLIC PANEL: CPT

## 2024-05-28 PROCEDURE — 83036 HEMOGLOBIN GLYCOSYLATED A1C: CPT

## 2024-05-28 PROCEDURE — 85027 COMPLETE CBC AUTOMATED: CPT

## 2024-05-28 PROCEDURE — P9603 ONE-WAY ALLOW PRORATED MILES: HCPCS

## 2024-05-28 PROCEDURE — 83880 ASSAY OF NATRIURETIC PEPTIDE: CPT

## 2024-05-28 PROCEDURE — 36415 COLL VENOUS BLD VENIPUNCTURE: CPT

## 2024-05-31 ENCOUNTER — HOSPITAL ENCOUNTER (OUTPATIENT)
Age: 69
Setting detail: SPECIMEN
Discharge: HOME | End: 2024-05-31

## 2024-05-31 LAB
ANION GAP SERPL CALCULATED.3IONS-SCNC: 11 MMOL/L (ref 9–17)
BUN SERPL-MCNC: 45 MG/DL (ref 8–23)
BUN/CREAT SERPL: 23 (ref 9–20)
CALCIUM SERPL-MCNC: 9.9 MG/DL (ref 8.6–10.4)
CHLORIDE SERPL-SCNC: 111 MMOL/L (ref 98–107)
CHOLEST SERPL-MCNC: 202 MG/DL (ref 0–199)
CHOLESTEROL/HDL RATIO: 6
CO2 SERPL-SCNC: 26 MMOL/L (ref 20–31)
CREAT SERPL-MCNC: 2 MG/DL (ref 0.5–0.9)
ERYTHROCYTE [DISTWIDTH] IN BLOOD BY AUTOMATED COUNT: 15.5 % (ref 11.8–14.4)
EST. AVERAGE GLUCOSE BLD GHB EST-MCNC: 143 MG/DL
GFR, ESTIMATED: 27 ML/MIN/1.73M2
GLUCOSE SERPL-MCNC: 121 MG/DL (ref 70–99)
HBA1C MFR BLD: 6.6 % (ref 4–6)
HCT VFR BLD AUTO: 35.8 % (ref 36.3–47.1)
HDLC SERPL-MCNC: 36 MG/DL
HGB BLD-MCNC: 10.7 G/DL (ref 11.9–15.1)
LDLC SERPL CALC-MCNC: 92 MG/DL (ref 0–100)
MCH RBC QN AUTO: 28.6 PG (ref 25.2–33.5)
MCHC RBC AUTO-ENTMCNC: 29.9 G/DL (ref 28.4–34.8)
MCV RBC AUTO: 95.7 FL (ref 82.6–102.9)
NRBC BLD-RTO: 0 PER 100 WBC
PLATELET # BLD AUTO: 310 K/UL (ref 138–453)
PMV BLD AUTO: 10.4 FL (ref 8.1–13.5)
POTASSIUM SERPL-SCNC: 4.5 MMOL/L (ref 3.7–5.3)
RBC # BLD AUTO: 3.74 M/UL (ref 3.95–5.11)
SODIUM SERPL-SCNC: 148 MMOL/L (ref 135–144)
TRIGL SERPL-MCNC: 369 MG/DL
VLDLC SERPL CALC-MCNC: 74 MG/DL
WBC OTHER # BLD: 8.3 K/UL (ref 3.5–11.3)

## 2024-05-31 PROCEDURE — 80061 LIPID PANEL: CPT

## 2024-05-31 PROCEDURE — P9603 ONE-WAY ALLOW PRORATED MILES: HCPCS

## 2024-05-31 PROCEDURE — 36415 COLL VENOUS BLD VENIPUNCTURE: CPT

## 2024-05-31 PROCEDURE — 83036 HEMOGLOBIN GLYCOSYLATED A1C: CPT

## 2024-05-31 PROCEDURE — 85027 COMPLETE CBC AUTOMATED: CPT

## 2024-05-31 PROCEDURE — 80048 BASIC METABOLIC PNL TOTAL CA: CPT

## 2024-06-04 ENCOUNTER — HOSPITAL ENCOUNTER (OUTPATIENT)
Age: 69
Setting detail: SPECIMEN
Discharge: HOME | End: 2024-06-04

## 2024-06-04 LAB
ANION GAP SERPL CALCULATED.3IONS-SCNC: 9 MMOL/L (ref 9–17)
BUN SERPL-MCNC: 45 MG/DL (ref 8–23)
BUN/CREAT SERPL: 25 (ref 9–20)
CALCIUM SERPL-MCNC: 10 MG/DL (ref 8.6–10.4)
CHLORIDE SERPL-SCNC: 108 MMOL/L (ref 98–107)
CO2 SERPL-SCNC: 30 MMOL/L (ref 20–31)
CREAT SERPL-MCNC: 1.8 MG/DL (ref 0.5–0.9)
ERYTHROCYTE [DISTWIDTH] IN BLOOD BY AUTOMATED COUNT: 15.6 % (ref 11.8–14.4)
GFR, ESTIMATED: 30 ML/MIN/1.73M2
GLUCOSE SERPL-MCNC: 123 MG/DL (ref 70–99)
HCT VFR BLD AUTO: 37.5 % (ref 36.3–47.1)
HGB BLD-MCNC: 11.1 G/DL (ref 11.9–15.1)
MCH RBC QN AUTO: 28.2 PG (ref 25.2–33.5)
MCHC RBC AUTO-ENTMCNC: 29.6 G/DL (ref 28.4–34.8)
MCV RBC AUTO: 95.2 FL (ref 82.6–102.9)
NRBC BLD-RTO: 0 PER 100 WBC
PLATELET # BLD AUTO: 323 K/UL (ref 138–453)
PMV BLD AUTO: 9.8 FL (ref 8.1–13.5)
POTASSIUM SERPL-SCNC: 4.3 MMOL/L (ref 3.7–5.3)
RBC # BLD AUTO: 3.94 M/UL (ref 3.95–5.11)
SODIUM SERPL-SCNC: 147 MMOL/L (ref 135–144)
WBC OTHER # BLD: 8.4 K/UL (ref 3.5–11.3)

## 2024-06-04 PROCEDURE — 85027 COMPLETE CBC AUTOMATED: CPT

## 2024-06-04 PROCEDURE — 80048 BASIC METABOLIC PNL TOTAL CA: CPT

## 2024-06-04 PROCEDURE — 36415 COLL VENOUS BLD VENIPUNCTURE: CPT

## 2024-06-04 PROCEDURE — P9603 ONE-WAY ALLOW PRORATED MILES: HCPCS

## 2024-06-24 ENCOUNTER — HOSPITAL ENCOUNTER (OUTPATIENT)
Age: 69
Setting detail: SPECIMEN
Discharge: HOME | End: 2024-06-24
Payer: MEDICAID

## 2024-06-24 LAB
ANION GAP SERPL CALCULATED.3IONS-SCNC: 11 MMOL/L (ref 9–17)
BUN SERPL-MCNC: 33 MG/DL (ref 8–23)
BUN/CREAT SERPL: 17 (ref 9–20)
CALCIUM SERPL-MCNC: 10.3 MG/DL (ref 8.6–10.4)
CHLORIDE SERPL-SCNC: 107 MMOL/L (ref 98–107)
CO2 SERPL-SCNC: 30 MMOL/L (ref 20–31)
CREAT SERPL-MCNC: 1.9 MG/DL (ref 0.5–0.9)
ERYTHROCYTE [DISTWIDTH] IN BLOOD BY AUTOMATED COUNT: 15.2 % (ref 11.8–14.4)
GFR, ESTIMATED: 28 ML/MIN/1.73M2
GLUCOSE SERPL-MCNC: 97 MG/DL (ref 70–99)
HCT VFR BLD AUTO: 36.9 % (ref 36.3–47.1)
HGB BLD-MCNC: 10.7 G/DL (ref 11.9–15.1)
MCH RBC QN AUTO: 27.8 PG (ref 25.2–33.5)
MCHC RBC AUTO-ENTMCNC: 29 G/DL (ref 28.4–34.8)
MCV RBC AUTO: 95.8 FL (ref 82.6–102.9)
NRBC BLD-RTO: 0 PER 100 WBC
PLATELET # BLD AUTO: 326 K/UL (ref 138–453)
PMV BLD AUTO: 10.1 FL (ref 8.1–13.5)
POTASSIUM SERPL-SCNC: 4.3 MMOL/L (ref 3.7–5.3)
RBC # BLD AUTO: 3.85 M/UL (ref 3.95–5.11)
SODIUM SERPL-SCNC: 148 MMOL/L (ref 135–144)
WBC OTHER # BLD: 7.5 K/UL (ref 3.5–11.3)

## 2024-06-24 PROCEDURE — 80048 BASIC METABOLIC PNL TOTAL CA: CPT

## 2024-06-24 PROCEDURE — 36415 COLL VENOUS BLD VENIPUNCTURE: CPT

## 2024-06-24 PROCEDURE — 85027 COMPLETE CBC AUTOMATED: CPT

## 2024-06-24 PROCEDURE — P9603 ONE-WAY ALLOW PRORATED MILES: HCPCS

## 2024-06-26 PROCEDURE — 87088 URINE BACTERIA CULTURE: CPT

## 2024-06-26 PROCEDURE — 87186 SC STD MICRODIL/AGAR DIL: CPT

## 2024-06-26 PROCEDURE — 87181 SC STD AGAR DILUTION PER AGT: CPT

## 2024-06-26 PROCEDURE — 87086 URINE CULTURE/COLONY COUNT: CPT

## 2024-06-26 PROCEDURE — 87077 CULTURE AEROBIC IDENTIFY: CPT

## 2024-06-26 PROCEDURE — 81001 URINALYSIS AUTO W/SCOPE: CPT

## 2024-06-27 ENCOUNTER — HOSPITAL ENCOUNTER (OUTPATIENT)
Age: 69
Setting detail: SPECIMEN
Discharge: HOME | End: 2024-06-27

## 2024-06-27 LAB
BACTERIA URNS QL MICRO: ABNORMAL
BILIRUB UR QL STRIP: NEGATIVE
CLARITY UR: ABNORMAL
COLOR UR: YELLOW
EPI CELLS #/AREA URNS HPF: ABNORMAL /HPF (ref 0–5)
GLUCOSE UR STRIP-MCNC: NEGATIVE MG/DL
HGB UR QL STRIP.AUTO: ABNORMAL
KETONES UR STRIP-MCNC: NEGATIVE MG/DL
LEUKOCYTE ESTERASE UR QL STRIP: ABNORMAL
NITRITE UR QL STRIP: NEGATIVE
PH UR STRIP: 5.5 [PH] (ref 5–8)
PROT UR STRIP-MCNC: ABNORMAL MG/DL
RBC #/AREA URNS HPF: ABNORMAL /HPF (ref 0–2)
SP GR UR STRIP: 1.02 (ref 1–1.03)
UROBILINOGEN UR STRIP-ACNC: NORMAL EU/DL (ref 0–1)
WBC #/AREA URNS HPF: ABNORMAL /HPF (ref 0–5)

## 2024-06-28 LAB
MICROORGANISM SPEC CULT: ABNORMAL
MICROORGANISM SPEC CULT: ABNORMAL
SPECIMEN DESCRIPTION: ABNORMAL

## 2024-07-31 ENCOUNTER — HOSPITAL ENCOUNTER (OUTPATIENT)
Age: 69
Setting detail: SPECIMEN
Discharge: HOME | End: 2024-07-31

## 2024-07-31 LAB
ALBUMIN SERPL-MCNC: 3.5 G/DL (ref 3.5–5.2)
ALP SERPL-CCNC: 93 U/L (ref 35–104)
ALT SERPL-CCNC: 14 U/L (ref 5–33)
AMMONIA PLAS-SCNC: 30 UMOL/L (ref 11–51)
ANION GAP SERPL CALCULATED.3IONS-SCNC: 14 MMOL/L (ref 9–17)
AST SERPL-CCNC: 17 U/L
BILIRUB SERPL-MCNC: 0.3 MG/DL (ref 0.3–1.2)
BUN SERPL-MCNC: 40 MG/DL (ref 8–23)
BUN/CREAT SERPL: 20 (ref 9–20)
CALCIUM SERPL-MCNC: 10.1 MG/DL (ref 8.6–10.4)
CHLORIDE SERPL-SCNC: 106 MMOL/L (ref 98–107)
CO2 SERPL-SCNC: 23 MMOL/L (ref 20–31)
CREAT SERPL-MCNC: 2 MG/DL (ref 0.5–0.9)
GFR, ESTIMATED: 27 ML/MIN/1.73M2
GLUCOSE P FAST SERPL-MCNC: 85 MG/DL (ref 70–99)
POTASSIUM SERPL-SCNC: 4.3 MMOL/L (ref 3.7–5.3)
PROT SERPL-MCNC: 6.6 G/DL (ref 6.4–8.3)
SODIUM SERPL-SCNC: 143 MMOL/L (ref 135–144)
T4 SERPL-MCNC: 5.9 UG/DL (ref 4.5–11.7)

## 2024-07-31 PROCEDURE — 80053 COMPREHEN METABOLIC PANEL: CPT

## 2024-07-31 PROCEDURE — 36415 COLL VENOUS BLD VENIPUNCTURE: CPT

## 2024-07-31 PROCEDURE — 82140 ASSAY OF AMMONIA: CPT

## 2024-07-31 PROCEDURE — 84436 ASSAY OF TOTAL THYROXINE: CPT

## 2024-08-05 ENCOUNTER — HOSPITAL ENCOUNTER (OUTPATIENT)
Age: 69
Setting detail: SPECIMEN
Discharge: HOME | End: 2024-08-05

## 2024-08-05 LAB
ALBUMIN SERPL-MCNC: 3.7 G/DL (ref 3.5–5.2)
ALP SERPL-CCNC: 97 U/L (ref 35–104)
ALT SERPL-CCNC: 16 U/L (ref 5–33)
AMMONIA PLAS-SCNC: 23 UMOL/L (ref 11–51)
ANION GAP SERPL CALCULATED.3IONS-SCNC: 12 MMOL/L (ref 9–17)
AST SERPL-CCNC: 14 U/L
BILIRUB SERPL-MCNC: 0.2 MG/DL (ref 0.3–1.2)
BUN SERPL-MCNC: 42 MG/DL (ref 8–23)
BUN/CREAT SERPL: 20 (ref 9–20)
CALCIUM SERPL-MCNC: 10.3 MG/DL (ref 8.6–10.4)
CHLORIDE SERPL-SCNC: 108 MMOL/L (ref 98–107)
CO2 SERPL-SCNC: 26 MMOL/L (ref 20–31)
CREAT SERPL-MCNC: 2.1 MG/DL (ref 0.5–0.9)
ERYTHROCYTE [DISTWIDTH] IN BLOOD BY AUTOMATED COUNT: 15.2 % (ref 11.8–14.4)
GFR, ESTIMATED: 25 ML/MIN/1.73M2
GLUCOSE SERPL-MCNC: 159 MG/DL (ref 70–99)
HCT VFR BLD AUTO: 37.3 % (ref 36.3–47.1)
HGB BLD-MCNC: 11.1 G/DL (ref 11.9–15.1)
LACTATE BLDV-SCNC: 2.7 MMOL/L (ref 0.5–2.2)
MCH RBC QN AUTO: 28.8 PG (ref 25.2–33.5)
MCHC RBC AUTO-ENTMCNC: 29.8 G/DL (ref 28.4–34.8)
MCV RBC AUTO: 96.9 FL (ref 82.6–102.9)
NRBC BLD-RTO: 0 PER 100 WBC
PLATELET # BLD AUTO: 313 K/UL (ref 138–453)
PMV BLD AUTO: 9.9 FL (ref 8.1–13.5)
POTASSIUM SERPL-SCNC: 4.4 MMOL/L (ref 3.7–5.3)
PROT SERPL-MCNC: 6.5 G/DL (ref 6.4–8.3)
RBC # BLD AUTO: 3.85 M/UL (ref 3.95–5.11)
SODIUM SERPL-SCNC: 146 MMOL/L (ref 135–144)
TSH SERPL DL<=0.05 MIU/L-ACNC: 1.84 UIU/ML (ref 0.3–5)
WBC OTHER # BLD: 8.9 K/UL (ref 3.5–11.3)

## 2024-08-05 PROCEDURE — 85027 COMPLETE CBC AUTOMATED: CPT

## 2024-08-05 PROCEDURE — 36415 COLL VENOUS BLD VENIPUNCTURE: CPT

## 2024-08-05 PROCEDURE — 84443 ASSAY THYROID STIM HORMONE: CPT

## 2024-08-05 PROCEDURE — 82140 ASSAY OF AMMONIA: CPT

## 2024-08-05 PROCEDURE — 83605 ASSAY OF LACTIC ACID: CPT

## 2024-08-05 PROCEDURE — P9603 ONE-WAY ALLOW PRORATED MILES: HCPCS

## 2024-08-05 PROCEDURE — 80053 COMPREHEN METABOLIC PANEL: CPT

## 2024-08-07 ENCOUNTER — HOSPITAL ENCOUNTER (OUTPATIENT)
Age: 69
Setting detail: SPECIMEN
Discharge: HOME | End: 2024-08-07
Payer: MEDICAID

## 2024-08-07 LAB
AMORPH SED URNS QL MICRO: ABNORMAL
BILIRUB UR QL STRIP: NEGATIVE
CLARITY UR: ABNORMAL
COLOR UR: YELLOW
EPI CELLS #/AREA URNS HPF: ABNORMAL /HPF (ref 0–5)
GLUCOSE UR STRIP-MCNC: NEGATIVE MG/DL
HGB UR QL STRIP.AUTO: ABNORMAL
KETONES UR STRIP-MCNC: NEGATIVE MG/DL
LEUKOCYTE ESTERASE UR QL STRIP: NEGATIVE
NITRITE UR QL STRIP: NEGATIVE
PH UR STRIP: 5.5 [PH] (ref 5–8)
PROT UR STRIP-MCNC: ABNORMAL MG/DL
RBC #/AREA URNS HPF: ABNORMAL /HPF (ref 0–2)
SP GR UR STRIP: 1.02 (ref 1–1.03)
UROBILINOGEN UR STRIP-ACNC: NORMAL EU/DL (ref 0–1)
WBC #/AREA URNS HPF: ABNORMAL /HPF (ref 0–5)

## 2024-08-07 PROCEDURE — 87086 URINE CULTURE/COLONY COUNT: CPT

## 2024-08-07 PROCEDURE — 81001 URINALYSIS AUTO W/SCOPE: CPT

## 2024-08-07 PROCEDURE — 87077 CULTURE AEROBIC IDENTIFY: CPT

## 2024-08-08 LAB
MICROORGANISM SPEC CULT: ABNORMAL
SPECIMEN DESCRIPTION: ABNORMAL

## 2024-08-11 ENCOUNTER — HOSPITAL ENCOUNTER (OUTPATIENT)
Age: 69
Setting detail: SPECIMEN
Discharge: HOME | End: 2024-08-11
Payer: MEDICAID

## 2024-08-11 LAB — LACTATE BLDV-SCNC: 1.6 MMOL/L (ref 0.5–2.2)

## 2024-08-11 PROCEDURE — 83605 ASSAY OF LACTIC ACID: CPT

## 2024-08-11 PROCEDURE — P9603 ONE-WAY ALLOW PRORATED MILES: HCPCS

## 2024-08-11 PROCEDURE — 36415 COLL VENOUS BLD VENIPUNCTURE: CPT

## 2024-11-15 ENCOUNTER — HOSPITAL ENCOUNTER (OUTPATIENT)
Age: 69
Setting detail: SPECIMEN
Discharge: HOME | End: 2024-11-15

## 2024-11-15 LAB
25(OH)D3 SERPL-MCNC: 43.3 NG/ML (ref 30–100)
ANION GAP SERPL CALCULATED.3IONS-SCNC: 12 MMOL/L (ref 9–16)
BUN SERPL-MCNC: 31 MG/DL (ref 8–23)
CALCIUM SERPL-MCNC: 10.5 MG/DL (ref 8.8–10.2)
CHLORIDE SERPL-SCNC: 104 MMOL/L (ref 98–107)
CO2 SERPL-SCNC: 30 MMOL/L (ref 20–31)
CREAT SERPL-MCNC: 1.8 MG/DL (ref 0.5–0.9)
EST. AVERAGE GLUCOSE BLD GHB EST-MCNC: 126 MG/DL
GFR, ESTIMATED: 31 ML/MIN/1.73M2
GLUCOSE SERPL-MCNC: 111 MG/DL (ref 82–115)
HBA1C MFR BLD: 6 % (ref 4–6)
POTASSIUM SERPL-SCNC: 4.6 MMOL/L (ref 3.7–5.3)
SODIUM SERPL-SCNC: 146 MMOL/L (ref 136–145)

## 2024-11-15 PROCEDURE — 80048 BASIC METABOLIC PNL TOTAL CA: CPT

## 2024-11-15 PROCEDURE — P9603 ONE-WAY ALLOW PRORATED MILES: HCPCS

## 2024-11-15 PROCEDURE — 82306 VITAMIN D 25 HYDROXY: CPT

## 2024-11-15 PROCEDURE — 83036 HEMOGLOBIN GLYCOSYLATED A1C: CPT

## 2024-11-15 PROCEDURE — 36415 COLL VENOUS BLD VENIPUNCTURE: CPT

## 2025-01-22 ENCOUNTER — HOSPITAL ENCOUNTER (OUTPATIENT)
Age: 70
Setting detail: SPECIMEN
Discharge: HOME | End: 2025-01-22

## 2025-01-22 LAB
ANION GAP SERPL CALCULATED.3IONS-SCNC: 12 MMOL/L (ref 9–16)
BUN SERPL-MCNC: 41 MG/DL (ref 8–23)
CALCIUM SERPL-MCNC: 10.3 MG/DL (ref 8.8–10.2)
CHLORIDE SERPL-SCNC: 107 MMOL/L (ref 98–107)
CO2 SERPL-SCNC: 27 MMOL/L (ref 20–31)
CREAT SERPL-MCNC: 2 MG/DL (ref 0.5–0.9)
ERYTHROCYTE [DISTWIDTH] IN BLOOD BY AUTOMATED COUNT: 14.8 % (ref 11.8–14.4)
EST. AVERAGE GLUCOSE BLD GHB EST-MCNC: 126 MG/DL
GFR, ESTIMATED: 27 ML/MIN/1.73M2
GLUCOSE SERPL-MCNC: 88 MG/DL (ref 82–115)
HBA1C MFR BLD: 6 % (ref 4–6)
HCT VFR BLD AUTO: 37.6 % (ref 36.3–47.1)
HGB BLD-MCNC: 11.2 G/DL (ref 11.9–15.1)
MAGNESIUM SERPL-MCNC: 2.5 MG/DL (ref 1.6–2.4)
MCH RBC QN AUTO: 28.6 PG (ref 25.2–33.5)
MCHC RBC AUTO-ENTMCNC: 29.8 G/DL (ref 28.4–34.8)
MCV RBC AUTO: 95.9 FL (ref 82.6–102.9)
PLATELET # BLD AUTO: 348 K/UL (ref 138–453)
PMV BLD AUTO: 9.9 FL (ref 8.1–13.5)
POTASSIUM SERPL-SCNC: 4.9 MMOL/L (ref 3.7–5.3)
RBC # BLD AUTO: 3.92 M/UL (ref 3.95–5.11)
SODIUM SERPL-SCNC: 146 MMOL/L (ref 136–145)
WBC OTHER # BLD: 7.8 K/UL (ref 3.5–11.3)

## 2025-01-22 PROCEDURE — 85027 COMPLETE CBC AUTOMATED: CPT

## 2025-01-22 PROCEDURE — 83036 HEMOGLOBIN GLYCOSYLATED A1C: CPT

## 2025-01-22 PROCEDURE — P9603 ONE-WAY ALLOW PRORATED MILES: HCPCS

## 2025-01-22 PROCEDURE — 36415 COLL VENOUS BLD VENIPUNCTURE: CPT

## 2025-01-22 PROCEDURE — 83735 ASSAY OF MAGNESIUM: CPT

## 2025-01-22 PROCEDURE — 80048 BASIC METABOLIC PNL TOTAL CA: CPT

## 2025-07-11 ENCOUNTER — HOSPITAL ENCOUNTER (OUTPATIENT)
Age: 70
Setting detail: SPECIMEN
Discharge: HOME | End: 2025-07-11

## 2025-07-11 LAB
25(OH)D3 SERPL-MCNC: 46.4 NG/ML (ref 30–100)
ANION GAP SERPL CALCULATED.3IONS-SCNC: 12 MMOL/L (ref 9–16)
BUN SERPL-MCNC: 67 MG/DL (ref 8–23)
CALCIUM SERPL-MCNC: 10 MG/DL (ref 8.8–10.2)
CHLORIDE SERPL-SCNC: 109 MMOL/L (ref 98–107)
CHOLEST SERPL-MCNC: 224 MG/DL (ref 0–199)
CHOLESTEROL/HDL RATIO: 6.4
CO2 SERPL-SCNC: 28 MMOL/L (ref 20–31)
CREAT SERPL-MCNC: 2.6 MG/DL (ref 0.5–0.9)
ERYTHROCYTE [DISTWIDTH] IN BLOOD BY AUTOMATED COUNT: 15.2 % (ref 11.8–14.4)
EST. AVERAGE GLUCOSE BLD GHB EST-MCNC: 117 MG/DL
GFR, ESTIMATED: 19 ML/MIN/1.73M2
GLUCOSE SERPL-MCNC: 107 MG/DL (ref 82–115)
HBA1C MFR BLD: 5.7 % (ref 4–6)
HCT VFR BLD AUTO: 31.5 % (ref 36.3–47.1)
HDLC SERPL-MCNC: 35 MG/DL
HGB BLD-MCNC: 9.5 G/DL (ref 11.9–15.1)
IRON SATN MFR SERPL: 32 % (ref 20–55)
IRON SERPL-MCNC: 63 UG/DL (ref 37–145)
LDLC SERPL CALC-MCNC: 130 MG/DL (ref 0–100)
MAGNESIUM SERPL-MCNC: 2.5 MG/DL (ref 1.6–2.4)
MCH RBC QN AUTO: 29.1 PG (ref 25.2–33.5)
MCHC RBC AUTO-ENTMCNC: 30.2 G/DL (ref 28.4–34.8)
MCV RBC AUTO: 96.6 FL (ref 82.6–102.9)
NRBC BLD-RTO: 0 PER 100 WBC
PLATELET # BLD AUTO: 335 K/UL (ref 138–453)
PMV BLD AUTO: 9.9 FL (ref 8.1–13.5)
POTASSIUM SERPL-SCNC: 5.1 MMOL/L (ref 3.7–5.3)
RBC # BLD AUTO: 3.26 M/UL (ref 3.95–5.11)
SODIUM SERPL-SCNC: 149 MMOL/L (ref 136–145)
TIBC SERPL-MCNC: 200 UG/DL (ref 250–450)
TRIGL SERPL-MCNC: 294 MG/DL
UNSATURATED IRON BINDING CAPACITY: 137 UG/DL (ref 112–347)
VLDLC SERPL CALC-MCNC: 59 MG/DL (ref 1–30)
WBC OTHER # BLD: 8.4 K/UL (ref 3.5–11.3)

## 2025-07-11 PROCEDURE — 83550 IRON BINDING TEST: CPT

## 2025-07-11 PROCEDURE — 83036 HEMOGLOBIN GLYCOSYLATED A1C: CPT

## 2025-07-11 PROCEDURE — 80048 BASIC METABOLIC PNL TOTAL CA: CPT

## 2025-07-11 PROCEDURE — 83540 ASSAY OF IRON: CPT

## 2025-07-11 PROCEDURE — 36415 COLL VENOUS BLD VENIPUNCTURE: CPT

## 2025-07-11 PROCEDURE — 80061 LIPID PANEL: CPT

## 2025-07-11 PROCEDURE — 82306 VITAMIN D 25 HYDROXY: CPT

## 2025-07-11 PROCEDURE — 85027 COMPLETE CBC AUTOMATED: CPT

## 2025-07-11 PROCEDURE — 83735 ASSAY OF MAGNESIUM: CPT

## 2025-07-24 ENCOUNTER — HOSPITAL ENCOUNTER (OUTPATIENT)
Age: 70
Setting detail: SPECIMEN
Discharge: HOME | End: 2025-07-24

## 2025-07-24 LAB
ANION GAP SERPL CALCULATED.3IONS-SCNC: 13 MMOL/L (ref 9–16)
BUN SERPL-MCNC: 52 MG/DL (ref 8–23)
CALCIUM SERPL-MCNC: 10.3 MG/DL (ref 8.8–10.2)
CHLORIDE SERPL-SCNC: 105 MMOL/L (ref 98–107)
CO2 SERPL-SCNC: 28 MMOL/L (ref 20–31)
CREAT SERPL-MCNC: 2.8 MG/DL (ref 0.5–0.9)
GFR, ESTIMATED: 18 ML/MIN/1.73M2
GLUCOSE SERPL-MCNC: 100 MG/DL (ref 82–115)
POTASSIUM SERPL-SCNC: 4.6 MMOL/L (ref 3.7–5.3)
SODIUM SERPL-SCNC: 146 MMOL/L (ref 136–145)

## 2025-07-24 PROCEDURE — 36415 COLL VENOUS BLD VENIPUNCTURE: CPT

## 2025-07-24 PROCEDURE — 80048 BASIC METABOLIC PNL TOTAL CA: CPT

## 2025-09-04 ENCOUNTER — HOSPITAL ENCOUNTER (OUTPATIENT)
Age: 70
Setting detail: SPECIMEN
Discharge: HOME | End: 2025-09-04

## 2025-09-04 LAB
25(OH)D3 SERPL-MCNC: 54.8 NG/ML (ref 30–100)
ANION GAP SERPL CALCULATED.3IONS-SCNC: 12 MMOL/L (ref 9–16)
BASOPHILS # BLD: <0.03 K/UL (ref 0–0.2)
BASOPHILS NFR BLD: 0 % (ref 0–2)
BUN SERPL-MCNC: 49 MG/DL (ref 8–23)
CA-I BLD-SCNC: 1.35 MMOL/L (ref 1.13–1.33)
CALCIUM SERPL-MCNC: 10.2 MG/DL (ref 8.8–10.2)
CHLORIDE SERPL-SCNC: 111 MMOL/L (ref 98–107)
CO2 SERPL-SCNC: 26 MMOL/L (ref 20–31)
CREAT SERPL-MCNC: 2.3 MG/DL (ref 0.5–0.9)
EOSINOPHIL # BLD: 0.32 K/UL (ref 0–0.44)
EOSINOPHILS RELATIVE PERCENT: 3 % (ref 1–4)
ERYTHROCYTE [DISTWIDTH] IN BLOOD BY AUTOMATED COUNT: 15.1 % (ref 11.8–14.4)
GFR, ESTIMATED: 23 ML/MIN/1.73M2
GLUCOSE SERPL-MCNC: 140 MG/DL (ref 82–115)
HCT VFR BLD AUTO: 34.9 % (ref 36.3–47.1)
HGB BLD-MCNC: 10.5 G/DL (ref 11.9–15.1)
IMM GRANULOCYTES # BLD AUTO: 0.04 K/UL (ref 0–0.3)
IMM GRANULOCYTES NFR BLD: 0 %
LYMPHOCYTES NFR BLD: 2.25 K/UL (ref 1.1–3.7)
LYMPHOCYTES RELATIVE PERCENT: 24 % (ref 24–43)
MAGNESIUM SERPL-MCNC: 2.4 MG/DL (ref 1.6–2.4)
MCH RBC QN AUTO: 29.4 PG (ref 25.2–33.5)
MCHC RBC AUTO-ENTMCNC: 30.1 G/DL (ref 28.4–34.8)
MCV RBC AUTO: 97.8 FL (ref 82.6–102.9)
MONOCYTES NFR BLD: 0.66 K/UL (ref 0.1–1.2)
MONOCYTES NFR BLD: 7 % (ref 3–12)
NEUTROPHILS NFR BLD: 66 % (ref 36–65)
NEUTS SEG NFR BLD: 5.99 K/UL (ref 1.5–8.1)
NRBC BLD-RTO: 0 PER 100 WBC
PARTIAL THROMBOPLASTIN TIME: 24.9 SEC (ref 23.9–33.8)
PLATELET # BLD AUTO: 347 K/UL (ref 138–453)
PMV BLD AUTO: 9.8 FL (ref 8.1–13.5)
POTASSIUM SERPL-SCNC: 4.8 MMOL/L (ref 3.7–5.3)
RBC # BLD AUTO: 3.57 M/UL (ref 3.95–5.11)
RBC # BLD: ABNORMAL 10*6/UL
SODIUM SERPL-SCNC: 149 MMOL/L (ref 136–145)
WBC OTHER # BLD: 9.3 K/UL (ref 3.5–11.3)

## 2025-09-04 PROCEDURE — 80048 BASIC METABOLIC PNL TOTAL CA: CPT

## 2025-09-04 PROCEDURE — 83735 ASSAY OF MAGNESIUM: CPT

## 2025-09-04 PROCEDURE — 82330 ASSAY OF CALCIUM: CPT

## 2025-09-04 PROCEDURE — 85025 COMPLETE CBC W/AUTO DIFF WBC: CPT

## 2025-09-04 PROCEDURE — 36415 COLL VENOUS BLD VENIPUNCTURE: CPT

## 2025-09-04 PROCEDURE — 82306 VITAMIN D 25 HYDROXY: CPT

## 2025-09-04 PROCEDURE — 85730 THROMBOPLASTIN TIME PARTIAL: CPT
